# Patient Record
Sex: MALE | Race: WHITE
[De-identification: names, ages, dates, MRNs, and addresses within clinical notes are randomized per-mention and may not be internally consistent; named-entity substitution may affect disease eponyms.]

---

## 2022-03-31 ENCOUNTER — TRANSCRIPTION ENCOUNTER (OUTPATIENT)
Age: 42
End: 2022-03-31

## 2023-06-27 ENCOUNTER — NON-APPOINTMENT (OUTPATIENT)
Age: 43
End: 2023-06-27

## 2024-01-19 ENCOUNTER — APPOINTMENT (OUTPATIENT)
Dept: CARDIOLOGY | Facility: CLINIC | Age: 44
End: 2024-01-19
Payer: COMMERCIAL

## 2024-01-19 ENCOUNTER — NON-APPOINTMENT (OUTPATIENT)
Age: 44
End: 2024-01-19

## 2024-01-19 VITALS
WEIGHT: 188 LBS | BODY MASS INDEX: 26.92 KG/M2 | HEIGHT: 70 IN | SYSTOLIC BLOOD PRESSURE: 118 MMHG | OXYGEN SATURATION: 99 % | HEART RATE: 58 BPM | DIASTOLIC BLOOD PRESSURE: 81 MMHG

## 2024-01-19 DIAGNOSIS — I35.1 NONRHEUMATIC AORTIC (VALVE) INSUFFICIENCY: ICD-10-CM

## 2024-01-19 DIAGNOSIS — Z82.49 FAMILY HISTORY OF ISCHEMIC HEART DISEASE AND OTHER DISEASES OF THE CIRCULATORY SYSTEM: ICD-10-CM

## 2024-01-19 DIAGNOSIS — R42 DIZZINESS AND GIDDINESS: ICD-10-CM

## 2024-01-19 DIAGNOSIS — Z87.74 PERSONAL HISTORY OF (CORRECTED) CONGENITAL MALFORMATIONS OF HEART AND CIRCULATORY SYSTEM: ICD-10-CM

## 2024-01-19 PROBLEM — Z00.00 ENCOUNTER FOR PREVENTIVE HEALTH EXAMINATION: Status: ACTIVE | Noted: 2024-01-19

## 2024-01-19 PROCEDURE — 93000 ELECTROCARDIOGRAM COMPLETE: CPT

## 2024-01-19 PROCEDURE — 99204 OFFICE O/P NEW MOD 45 MIN: CPT

## 2024-01-19 NOTE — HISTORY OF PRESENT ILLNESS
[FreeTextEntry1] : Anjel is a pleasant 43-year-old male here for initial evaluation.  He is generally healthy, and does not take any medications.  He was diagnosed with a bicuspid aortic valve about 15 years ago, and last saw his cardiologist at [facility] about 5 years ago.  His valve has been followed by echocardiograms over the years, and he does not remember being told that his valve was significantly stenotic or regurgitant.  Recently, he has been exercising/running, and reports some lightheadedness, when his heart rate goes into the 180 range.  He will usually slow down, but he starts to feel better.  He has no significant dyspnea on exertion, chest pain, or palpitations.  There is no family history of CAD.  He had a grandfather with a pacemaker.  He denies toxic habits.

## 2024-01-19 NOTE — DISCUSSION/SUMMARY
[FreeTextEntry1] : From a cardiovascular standpoint, Anjel has been doing well.  He reports a history of a bicuspid aortic valve, and I repeating an echocardiogram for further evaluation into this.  He is reporting some lightheadedness/dizziness on extreme exertion, and will be set up for an exercise stress test, to evaluate his blood pressure response to exercise.  I have a feeling that his symptoms are related to deconditioning and dehydration, rather than the valve.  I have requested a copy of his most recent cardiac records and blood work.  We will speak after the above testing, and arrange follow-up. [EKG obtained to assist in diagnosis and management of assessed problem(s)] : EKG obtained to assist in diagnosis and management of assessed problem(s)

## 2024-01-23 ENCOUNTER — NON-APPOINTMENT (OUTPATIENT)
Age: 44
End: 2024-01-23

## 2024-01-24 ENCOUNTER — APPOINTMENT (OUTPATIENT)
Dept: CARDIOLOGY | Facility: CLINIC | Age: 44
End: 2024-01-24
Payer: COMMERCIAL

## 2024-01-24 ENCOUNTER — TRANSCRIPTION ENCOUNTER (OUTPATIENT)
Age: 44
End: 2024-01-24

## 2024-01-24 PROCEDURE — 93015 CV STRESS TEST SUPVJ I&R: CPT

## 2024-01-24 PROCEDURE — 93306 TTE W/DOPPLER COMPLETE: CPT

## 2024-05-20 ENCOUNTER — APPOINTMENT (OUTPATIENT)
Dept: ORTHOPEDIC SURGERY | Facility: CLINIC | Age: 44
End: 2024-05-20
Payer: COMMERCIAL

## 2024-05-20 VITALS — HEIGHT: 71 IN | BODY MASS INDEX: 25.9 KG/M2 | WEIGHT: 185 LBS

## 2024-05-20 DIAGNOSIS — M25.561 PAIN IN RIGHT KNEE: ICD-10-CM

## 2024-05-20 DIAGNOSIS — M76.31 ILIOTIBIAL BAND SYNDROME, RIGHT LEG: ICD-10-CM

## 2024-05-20 PROCEDURE — 99204 OFFICE O/P NEW MOD 45 MIN: CPT

## 2024-05-20 PROCEDURE — 73564 X-RAY EXAM KNEE 4 OR MORE: CPT | Mod: RT

## 2024-05-20 RX ORDER — NAPROXEN 500 MG/1
500 TABLET ORAL
Qty: 60 | Refills: 0 | Status: ACTIVE | COMMUNITY
Start: 2024-05-20 | End: 1900-01-01

## 2024-05-21 PROBLEM — M76.31 ILIOTIBIAL BAND SYNDROME OF RIGHT SIDE: Status: ACTIVE | Noted: 2024-05-20

## 2024-05-21 NOTE — IMAGING
[Right] : right knee [de-identified] : The patient is a well appearing 44 year old male of their stated age. Patient ambulates with a normal gait. Negative straight leg raise bilateral   Effected Knee: RIGHT  ROM:  0-145 degrees Lachman: Negative Pivot Shift: Negative Anterior Drawer: Negative Posterior Drawer / Sag: Negative Varus Stress 0 degrees: Stable Varus Stress 30 degrees: Stable Valgus Stress 0 degrees: Stable Valgus Stress 30 degrees: Stable Medial Angel: Negative Lateral Angel: Negative Patella Glide: 2+ Patella Apprehension: Negative Patella Grind: Negative   Palpation: Medial Joint Line: Nontender Lateral Joint Line: Nontender Medial Collateral Ligament: Nontender Lateral Collateral Ligament/PLC: Nontender Distal Femur: Nontender Proximal Tibia: Nontender Tibial Tubercle: Nontender Distal Pole Patella: Nontender Quadriceps Tendon: Nontender &  Intact Patella Tendon: Nontender &  Intact Medial Femoral Condyle: Nontender Medial Distal Hamstring/PES: Nontender Lateral Distal Hamstring: Nontender & Stable Iliotibial Band: TENDER AND TIGHT  Medial Patellofemoral Ligament: Nontender Adductor: Nontender Proximal GSC-Plantaris: Nontender Calf: Supple & Nontender   Inspection: Deformity: No Erythema: No Ecchymosis: No Abrasions: No Effusion: No Prepatella Bursitis: No Neurologic Exam: Sensation L4-S1: Grossly Intact Motor Exam: Quadriceps: 5 out of 5 Hamstrings: 5 out of 5 EHL: 5 out of 5 FHL: 5 out of 5 TA: 5 out of 5 GS: 5 out of 5 Circulatory/Pulses: Dorsalis Pedis: 2+ Posterior Tibialis: 2+ Additional Pertinent Findings: None     Contralateral Knee:                          ROM: 0-145 degrees Other Pertinent Findings: None   Assessment: The patient is a 44 year old male with right knee pain and radiographic and physical exam findings consistent with IT band syndrome. The patient's condition is acute Documents/Results Reviewed Today: X-Ray right knee Tests/Studies Independently Interpreted Today: X-Ray right knee is benign Pertinent findings include: 0-145, Tight and tender IT band Confounding medical conditions/concerns: None     Plan: Advised patient to run on both sides of the road not just one side. Patient will start physical therapy, HEP, and stretching. Advised patient to obtain Reparel sleeve. Recommended foam roll stretching Prescribed patient Naproxen 500mg BID x 2 weeks, then PRN for pain management and inflammation - use as directed and take with food. Tests Ordered: None  Prescription Medications Ordered: Naproxen 500 mg  Braces/DME Ordered: Reparel sleeve  Activity/Work/Sports Status: As tolerated  Additional Instructions: Voltaren Gel and foam roll stretching Follow-Up: 6 weeks   The patient's current medication management of their orthopedic diagnosis was reviewed today: (1) We discussed a comprehensive treatment plan that included possible pharmaceutical management involving the use of prescription strength medications including but not limited to options such as oral Naprosyn 500mg BID, once daily Meloxicam 15 mg, or 500-650 mg Tylenol versus over the counter oral medications and topical prescription NSAID Pennsaid vs over the counter Voltaren gel.  Based on our extensive discussion, the patient was prescribed Naprosyn 500mg BID for two weeks.  It will then be used PRN for pain, inflammation and discomfort. (2) There is a moderate risk of morbidity with further treatment, especially from use of prescription strength medications and possible side effects of these medications which include upset stomach with oral medications, skin reactions to topical medications and cardiac/renal issues with long term use. (3) I recommended that the patient follow-up with their medical physician to discuss any significant specific potential issues with long term medication use such as interactions with current medications or with exacerbation of underlying medical comorbidities. (4) The benefits and risks associated with use of injectable, oral or topical, prescription and over the counter anti-inflammatory medications were discussed with the patient. The patient voiced understanding of the risks including but not limited to bleeding, stroke, kidney dysfunction, heart disease, and were referred to the black box warning label for further information.  Leslie PANCHAL attest that this documentation has been prepared under the direction and in the presence of Provider Dr. Lex Platt.  The documentation recorded by the scribe accurately reflects the services IDr. Lex, personally performed and the decisions made by me.  [FreeTextEntry9] : benign

## 2024-05-21 NOTE — HISTORY OF PRESENT ILLNESS
[de-identified] : The patient is a 44 year  old right hand dominant male who presents today complaining of right knee pain.  Date of Injury/Onset: 05/2023 Pain:    At Rest: 0/10  With Activity:  7/10  Mechanism of injury: Gradual onset of pain with running This is NOT a Work Related Injury being treated under Worker's Compensation. This is NOT an athletic injury occurring associated with an interscholastic or organized sports team. Quality of symptoms: stabbing lateral pain, clicking, tingling from knee to lower back  Improves with: rest, ice Worse with: running  Prior treatment: none Prior Imaging: none Out of work/sport: currently working  School/Sport/Position/Occupation: [employer] job Additional Information: None

## 2024-06-11 ENCOUNTER — APPOINTMENT (OUTPATIENT)
Dept: ORTHOPEDIC SURGERY | Facility: CLINIC | Age: 44
End: 2024-06-11
Payer: COMMERCIAL

## 2024-06-11 ENCOUNTER — RESULT CHARGE (OUTPATIENT)
Age: 44
End: 2024-06-11

## 2024-06-11 DIAGNOSIS — M25.572 PAIN IN LEFT ANKLE AND JOINTS OF LEFT FOOT: ICD-10-CM

## 2024-06-11 PROCEDURE — 99204 OFFICE O/P NEW MOD 45 MIN: CPT

## 2024-06-11 PROCEDURE — L4361: CPT | Mod: LT

## 2024-06-12 ENCOUNTER — APPOINTMENT (OUTPATIENT)
Dept: MRI IMAGING | Facility: CLINIC | Age: 44
End: 2024-06-12
Payer: COMMERCIAL

## 2024-06-12 PROCEDURE — 73718 MRI LOWER EXTREMITY W/O DYE: CPT | Mod: LT

## 2024-06-12 NOTE — HISTORY OF PRESENT ILLNESS
[de-identified] : The patient is a 44 year old right hand dominant male who presents today for left ankle pain . Date of Injury/Onset: 6/5/24, exacerbated 6/10/24 Pain: At Rest: 0/10 With Activity: 10/10 Mechanism of injury: Shin discomfort last week resembles shin splints and he was running last night and felt pain on [place] 4 but ran 5 miles. Could not put weight on his foot after  This is NOT a Work Related Injury being treated under Worker's Compensation. This is NOT an athletic injury occurring associated with an interscholastic or organized sports team. Quality of symptoms: Sharp, aching, throbbing,  Improves with: compression sleeve Worse with: running  Prior treatment: none Prior Imaging: none Out of work/sport: _, since _ School/Sport/Position/Occupation: Additional Information:

## 2024-06-12 NOTE — IMAGING
[Left] : left tibia/fibula [de-identified] : The patient is a well appearing 44 year  old male of their stated age. Patient ambulates with a normal gait. Negative straight leg raise.   Effected Foot and Ankle: LEFT   Inspection: Erythema: None Ecchymosis: None Abrasions: None Effusion: Mild Deformity: None Pes [place] Valgus: Negative Pes Cavus: Negative     Palpation: Crepitus: None Medial Malleolus: Nontender Lateral Malleolus: Nontender Syndesmosis: Nontender Proximal Fibula: Nontender ATFL: Nontender CFL: Nontender Deltoid: Nontender Calcaneus: Nontender Talar Head/Neck: Nontender Peroneal Tendons: Nontender Posterior Tibialis: Nontender Achilles Tendon: Nontender & Intact Anterior Capsule: Nontender Hindfoot: Nontender Midfoot: Nontender Forefoot: Nontender Distal Fibula: TENDER Distal Tibia: TENDER MID   ROM: Ankle Dorsiflexion: 30 degrees Ankle Plantar Flexion: 45 degrees Motor: Dorsiflexion: 5 out of 5 Plantar Flexion: 5 out of 5 Inversion: 5  out of 5 Eversion: 5 out of 5 EHL: 5 out of 5 FHL: 5 out of 5     Provocative Testing: Anterior Drawer: Negative Syndesmosis Squeeze Test: Negative Roberts's Test: Negative Midfoot Stability/Rotation: Negative Heel Raise Inversion: Normal Triple Hop: Normal Neurologic Exam: L4-S1 Sensation: Grossly Intact Vascular Exam/Pulses: Dorsalis Pedis: 2+ Posterior Tibialis: 2+ Capillary Refill: <2 Seconds  Other Exams: None   Pertinent Contralateral Findings: None       Assessment: The patient is a 44 year old male with left ankle pain and radiographic and physical exam findings consistent with possible tibia/fibula stress reaction   The patients condition is acute Documents/Results Reviewed Today: X-Ray left ankle and left tibia/fibula Tests/Studies Independently Interpreted Today: X-Ray left ankle reveals evidence of calcaneal bone spur.  X-Ray left tibia/fibula is benign  Pertinent findings include: Mild effusion, Tender distal fibula, Tender Mid to distal tibia, unable to weight bear Confounding medical conditions/concerns: None     Plan:  Due to worsening pain and instability with mechanical symptoms, patient will obtain MRI left leg to evaluate for possible tibia/ fibula stress reaction. In the interim, we reviewed appropriate use of OTC anti-inflammatories as needed for pain, inflammation, and discomfort. Modify activity as discussed. In the interim, he will be put in a pneumatic CAM boot. He is shut down from running until further notice. Recommended taking Vitamin D 2,000 units.  Tests Ordered: MRI left leg  Prescription Medications Ordered: Discussed appropriate use of OTC anti-inflammatories and analgesics (including but not limited to Aleve, Advil, Tylenol, Motrin, Ibuprofen, Voltaren gel, etc.) Braces/DME Ordered: pneumatic CAM boot Activity/Work/Sports Status: Shut down from running Additional Instructions: Vitamin D supplementation  Follow-Up: After MRI   The patient's current medication management of their orthopedic diagnosis was reviewed today: (1) We discussed a comprehensive treatment plan that included possible pharmaceutical management involving the use of prescription strength medications including but not limited to options such as oral Naprosyn 500mg BID, once daily Meloxicam 15 mg, or 500-650 mg Tylenol versus over the counter oral medications and topical prescription NSAID Pennsaid vs over the counter Voltaren gel.  Based on our extensive discussion, the patient declined prescription medication and will use over the counter Advil, Alleve, Voltaren Gel or Tylenol as directed. (2) There is a moderate risk of morbidity with further treatment, especially from use of prescription strength medications and possible side effects of these medications which include upset stomach with oral medications, skin reactions to topical medications and cardiac/renal issues with long term use. (3) I recommended that the patient follow-up with their medical physician to discuss any significant specific potential issues with long term medication use such as interactions with current medications or with exacerbation of underlying medical comorbidities. (4) The benefits and risks associated with use of injectable, oral or topical, prescription and over the counter anti-inflammatory medications were discussed with the patient. The patient voiced understanding of the risks including but not limited to bleeding, stroke, kidney dysfunction, heart disease, and were referred to the black box warning label for further information.  Leslie PANCHAL attest that this documentation has been prepared under the direction and in the presence of Elise Andrew PA-C.  The documentation recorded by the scribe accurately reflects the service Elise PANCHAL PA-C, personally performed and the decisions made by me. [FreeTextEntry9] : calcaneal bone spur. [de-identified] : benign

## 2024-06-17 ENCOUNTER — APPOINTMENT (OUTPATIENT)
Dept: ORTHOPEDIC SURGERY | Facility: CLINIC | Age: 44
End: 2024-06-17
Payer: COMMERCIAL

## 2024-06-17 DIAGNOSIS — M79.662 PAIN IN LEFT LOWER LEG: ICD-10-CM

## 2024-06-17 DIAGNOSIS — S86.899A OTHER INJURY OF OTHER MUSCLE(S) AND TENDON(S) AT LOWER LEG LEVEL, UNSPECIFIED LEG, INITIAL ENCOUNTER: ICD-10-CM

## 2024-06-17 PROCEDURE — 99214 OFFICE O/P EST MOD 30 MIN: CPT

## 2024-06-17 RX ORDER — NAPROXEN 500 MG/1
500 TABLET ORAL
Qty: 60 | Refills: 0 | Status: ACTIVE | COMMUNITY
Start: 2024-06-17 | End: 1900-01-01

## 2024-06-18 PROBLEM — S86.899A ANTERIOR SHIN SPLINTS: Status: ACTIVE | Noted: 2024-06-17

## 2024-06-18 NOTE — DATA REVIEWED
[MRI] : MRI [Left] : left [Lower Extremities] : lower extremities [Report was reviewed and noted in the chart] : The report was reviewed and noted in the chart [I independently reviewed and interpreted images and report] : I independently reviewed and interpreted images and report [I reviewed the films/CD and additionally noted] : I reviewed the films/CD and additionally noted [FreeTextEntry1] : MRI left lower leg reveals no evidence of tibial bone marrow edema nor stress fracture line.

## 2024-06-18 NOTE — HISTORY OF PRESENT ILLNESS
[de-identified] : The patient is a 44 year old right hand dominant male who presents today for left ankle pain . Date of Injury/Onset: 6/5/24, exacerbated 6/10/24 Pain: At Rest: 0/10 With Activity: 10/10 Mechanism of injury: Shin discomfort last week resembles shin splints and he was running last night and felt pain on [place] 4 but ran 5 miles. Could not put weight on his foot after  This is NOT a Work Related Injury being treated under Worker's Compensation. This is NOT an athletic injury occurring associated with an interscholastic or organized sports team. 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

## 2024-06-18 NOTE — IMAGING
[de-identified] : The patient is a well appearing 44 year  old male of their stated age. Patient ambulates with a normal gait. Negative straight leg raise.  Effected Foot and Ankle: LEFT   Inspection: Erythema: None Ecchymosis: None Abrasions: None Effusion: Mild Deformity: None Pes [place] Valgus: Negative Pes Cavus: Negative     Palpation: Crepitus: None Medial Malleolus: Nontender Lateral Malleolus: Nontender Syndesmosis: Nontender Proximal Fibula: Nontender ATFL: Nontender CFL: Nontender Deltoid: Nontender Calcaneus: Nontender Talar Head/Neck: Nontender Peroneal Tendons: Nontender Posterior Tibialis: Nontender Achilles Tendon: Nontender & Intact Anterior Capsule: Nontender Hindfoot: Nontender Midfoot: Nontender Forefoot: Nontender Distal Fibula: TENDER Distal Tibia: TENDER MID   ROM: Ankle Dorsiflexion: 30 degrees Ankle Plantar Flexion: 45 degrees Motor: Dorsiflexion: 5 out of 5 Plantar Flexion: 5 out of 5 Inversion: 5  out of 5 Eversion: 5 out of 5 EHL: 5 out of 5 FHL: 5 out of 5     Provocative Testing: Anterior Drawer: Negative Syndesmosis Squeeze Test: Negative Roberts's Test: Negative Midfoot Stability/Rotation: Negative Heel Raise Inversion: Normal Triple Hop: Normal Neurologic Exam: L4-S1 Sensation: Grossly Intact Vascular Exam/Pulses: Dorsalis Pedis: 2+ Posterior Tibialis: 2+ Capillary Refill: <2 Seconds Other Exams: None Pertinent Contralateral Findings: None   Assessment: The patient is a 44 year old male with left ankle pain and radiographic and physical exam findings consistent with shin splint   The patients condition is acute.  Documents/Results Reviewed Today: MRI left lower leg  Tests/Studies Independently Interpreted Today: MRI left lower leg reveals no evidence of tibial bone marrow edema nor stress fracture line.  Pertinent findings include: Mild effusion, Tender distal fibula, Tender Mid to distal tibia, unable to weight bear Confounding medical conditions/concerns: None     Plan:  Advised the patient that his clinical examination, report of symptoms, and radiographic imaging is consistent with shin splints. We advised that this is an overuse injury related to high running activity. There are risks of this progressing into a bony injury and stress fracture. Patient will start physical therapy, HEP, and stretching. We reviewed all proper activity modifications to include changing running sneakers every 200-300 miles, cross training and overall moderation. Prescribed patient Naproxen 500mg BID x 2 weeks, then PRN for pain management and inflammation - use as directed and take with food. He may get into pneumatic CAM boot if symptoms are aggravated  Recommended taking Vitamin D 2,000 units. Modify activity as discussed.   Tests Ordered: None   Prescription Medications Ordered: Naproxen 500mg  Braces/DME Ordered: None  Activity/Work/Sports Status: Limit running Additional Instructions: Vitamin D supplementation  Follow-Up: 3 weeks    The patient's current medication management of their orthopedic diagnosis was reviewed today: (1) We discussed a comprehensive treatment plan that included possible pharmaceutical management involving the use of prescription strength medications including but not limited to options such as oral Naprosyn 500mg BID, once daily Meloxicam 15 mg, or 500-650 mg Tylenol versus over the counter oral medications and topical prescription NSAID Pennsaid vs over the counter Voltaren gel.  Based on our extensive discussion, the patient was prescribed Naprosyn 500mg BID for two weeks.  It will then be used PRN for pain, inflammation and discomfort. (2) There is a moderate risk of morbidity with further treatment, especially from use of prescription strength medications and possible side effects of these medications which include upset stomach with oral medications, skin reactions to topical medications and cardiac/renal issues with long term use. (3) I recommended that the patient follow-up with their medical physician to discuss any significant specific potential issues with long term medication use such as interactions with current medications or with exacerbation of underlying medical comorbidities. (4) The benefits and risks associated with use of injectable, oral or topical, prescription and over the counter anti-inflammatory medications were discussed with the patient. The patient voiced understanding of the risks including but not limited to bleeding, stroke, kidney dysfunction, heart disease, and were referred to the black box warning label for further information.   IKatherine attest that this documentation has been prepared under the direction and in the presence of Provider Dr. Lex Platt.   The documentation recorded by the scribe accurately reflects the services IDr. Lex, personally performed and the decisions made by me.

## 2024-07-01 ENCOUNTER — APPOINTMENT (OUTPATIENT)
Dept: ORTHOPEDIC SURGERY | Facility: CLINIC | Age: 44
End: 2024-07-01

## 2024-07-08 ENCOUNTER — APPOINTMENT (OUTPATIENT)
Dept: ORTHOPEDIC SURGERY | Facility: CLINIC | Age: 44
End: 2024-07-08

## 2024-12-11 ENCOUNTER — APPOINTMENT (OUTPATIENT)
Dept: CARDIOLOGY | Facility: CLINIC | Age: 44
End: 2024-12-11
Payer: COMMERCIAL

## 2024-12-11 DIAGNOSIS — R00.2 PALPITATIONS: ICD-10-CM

## 2024-12-11 PROCEDURE — 93000 ELECTROCARDIOGRAM COMPLETE: CPT

## 2024-12-16 ENCOUNTER — NON-APPOINTMENT (OUTPATIENT)
Age: 44
End: 2024-12-16

## 2025-01-30 ENCOUNTER — APPOINTMENT (OUTPATIENT)
Dept: CARDIOLOGY | Facility: CLINIC | Age: 45
End: 2025-01-30
Payer: COMMERCIAL

## 2025-01-30 ENCOUNTER — NON-APPOINTMENT (OUTPATIENT)
Age: 45
End: 2025-01-30

## 2025-01-30 VITALS
SYSTOLIC BLOOD PRESSURE: 122 MMHG | HEART RATE: 59 BPM | BODY MASS INDEX: 25.2 KG/M2 | HEIGHT: 71 IN | WEIGHT: 180 LBS | OXYGEN SATURATION: 97 % | DIASTOLIC BLOOD PRESSURE: 75 MMHG

## 2025-01-30 DIAGNOSIS — R00.2 PALPITATIONS: ICD-10-CM

## 2025-01-30 DIAGNOSIS — R01.1 CARDIAC MURMUR, UNSPECIFIED: ICD-10-CM

## 2025-01-30 PROCEDURE — 93000 ELECTROCARDIOGRAM COMPLETE: CPT

## 2025-01-30 PROCEDURE — 99214 OFFICE O/P EST MOD 30 MIN: CPT

## 2025-02-24 ENCOUNTER — APPOINTMENT (OUTPATIENT)
Dept: CARDIOLOGY | Facility: CLINIC | Age: 45
End: 2025-02-24
Payer: COMMERCIAL

## 2025-02-24 PROCEDURE — 93306 TTE W/DOPPLER COMPLETE: CPT
